# Patient Record
Sex: MALE | Race: WHITE | URBAN - METROPOLITAN AREA
[De-identification: names, ages, dates, MRNs, and addresses within clinical notes are randomized per-mention and may not be internally consistent; named-entity substitution may affect disease eponyms.]

---

## 2019-06-06 ENCOUNTER — OFFICE VISIT (OUTPATIENT)
Dept: FAMILY MEDICINE CLINIC | Age: 54
End: 2019-06-06

## 2019-06-06 VITALS
TEMPERATURE: 97.8 F | OXYGEN SATURATION: 96 % | SYSTOLIC BLOOD PRESSURE: 138 MMHG | BODY MASS INDEX: 39.05 KG/M2 | WEIGHT: 243 LBS | HEART RATE: 75 BPM | HEIGHT: 66 IN | DIASTOLIC BLOOD PRESSURE: 84 MMHG

## 2019-06-06 DIAGNOSIS — E66.01 SEVERE OBESITY (HCC): ICD-10-CM

## 2019-06-06 DIAGNOSIS — J06.9 VIRAL URI WITH COUGH: Primary | ICD-10-CM

## 2019-06-06 RX ORDER — PSEUDOEPHEDRINE HCL 30 MG
30 TABLET ORAL
Qty: 30 TAB | Refills: 0 | Status: SHIPPED | OUTPATIENT
Start: 2019-06-06

## 2019-06-06 NOTE — PROGRESS NOTES
Discharge instructions reviewed with patient    Medication list and understanding of medications reviewed with patient. OTC and herbal medications reviewed and added to med list if applicable  Barriers to adherence assessed. Guidance given regarding new medications this visit, including reason for taking this medicine, and common side effects.       Medicaid application given to patient      AVS given to patient explained patient expressed understands

## 2019-06-06 NOTE — PROGRESS NOTES
JHOAN Napier is a 46 y.o. male being seen today for   Chief Complaint   Patient presents with    Ear Pain     x 2 weeks  ( ED visit 5/31/19 completed meds)   IOV to care a Frankiejeff You for this pt with diabetes and ear pain x a week. he states that he was seen in ED 5/31 for ear pain and rx cortisporin ear gtt and meclizine but he did nto get hem yet. He had sinus prssure but this has improved. dizziness has not improved as much. Still has some mailaise. Has diabetes and states he takes metformin and it is well controlled. Sees PCP at the [place]. Past Medical History:   Diagnosis Date    Arthritis 2015    Diabetes ([facility] [address] 2009         ROS  Patient states that he is feeling well. Denies complaints of chest pain, shortness of breath, swelling of legs, dizziness or weakness. he denies nausea, vomiting or diarrhea. Current Outpatient Medications   Medication Sig    pseudoephedrine (SUDAFED) 30 mg tablet Take 1 Tab by mouth every six (6) hours as needed for Congestion. No current facility-administered medications for this visit. PE  Visit Vitals  /84 (BP 1 Location: Left arm, BP Patient Position: Sitting)   Pulse 75   Temp 97.8 °F (36.6 °C) (Temporal)   Ht 5' 6\" (1.676 m)   Wt 243 lb (110.2 kg)   SpO2 96%   BMI 39.22 kg/m²        Alert and oriented with normal mood and affect. he is well developed and well nourished . Lungs are clear without wheezing. Heart rate is regular without murmurs or gallops. There is no lower extremity edema. TMs retracted, no ear canal swelling. No erythema or fluid    No results found for this or any previous visit.       Assessment and Plan:        ICD-10-CM ICD-9-CM    1. Viral URI with cough J06.9 465.9     B97.89       Add low dose decongestant for ETD  Medicaid application  rtc prn      Cristhian Barrios MD

## 2019-06-09 PROBLEM — E66.01 SEVERE OBESITY (HCC): Status: ACTIVE | Noted: 2019-06-09
